# Patient Record
Sex: MALE | Race: OTHER
[De-identification: names, ages, dates, MRNs, and addresses within clinical notes are randomized per-mention and may not be internally consistent; named-entity substitution may affect disease eponyms.]

---

## 2017-04-27 ENCOUNTER — HOSPITAL ENCOUNTER (OUTPATIENT)
Dept: HOSPITAL 61 - KCIC MRI | Age: 52
Discharge: HOME | End: 2017-04-27
Payer: COMMERCIAL

## 2017-04-27 DIAGNOSIS — M75.121: Primary | ICD-10-CM

## 2017-04-27 PROCEDURE — 73221 MRI JOINT UPR EXTREM W/O DYE: CPT

## 2017-04-27 NOTE — KCIC
PROCEDURE 

MRI study of the right shoulder without contrast 

 

HISTORY 

Lifting injury. Patient dislocated the right shoulder 2 weeks ago. Right 

shoulder pain and limited range of motion. 

 

TECHNIQUE 

Noncontrast MRI sequences of the right shoulder were performed in all 3 

planes. 

 

COMPARISON 

None available. 

 

FINDINGS 

There is increased signal within the supraspinatus and infraspinatus 

tendons consistent with tendinosis. There is a complete tear of the 

lateral aspect of the supraspinatus tendon at the attachment to the 

footplate. The AP dimension of the defect measures 14 millimeters and the 

transverse dimension measures 15 millimeters. The tear extends into the 

lateral aspect of the infraspinatus tendon as well. A delamination 

intrasubstance tear of the lateral aspect of the infraspinatus tendon and 

muscle is seen. No retraction of the tendons is seen. A small amount of 

fluid is seen within the subdeltoid and subacromial bursa as a result of 

the complete tear. More prominent fluid is seen extending posteriorly and 

inferiorly between the infraspinatus/teres minor muscles and the deltoid 

muscle. The subscapularis tendon and transverse ligament are intact. The 

tendon of the long head of the biceps is intact. No muscle atrophy is 

evident. There is moderate primary degenerative osteoarthritis and 

spurring of the AC joint. There is mild spurring of the inferior edge of 

the acromial process. Type 3 acromial process is seen. These findings may 

impinge the acromial humeral space. There are mild chronic cystic and 

erosive changes of the posterior lateral aspect of the humeral head 

secondary to chronic impingement. The glenoid labrum is intact. No 

paralabral ganglion cyst or spinoglenoid notch ganglion cyst is seen. Mild

degenerative spurring of the glenohumeral joint is seen. Small 

glenohumeral joint effusion is seen. No loose body is evident. No fracture

or marrow infiltrative process is seen. 

 

IMPRESSION 

Complete rotator cuff tear. 

 

Electronically signed by: Heraclio Hamm MD (Apr 27, 2017 14:58:27)

## 2017-08-17 ENCOUNTER — HOSPITAL ENCOUNTER (OUTPATIENT)
Dept: HOSPITAL 61 - SURGPAT | Age: 52
Discharge: HOME | End: 2017-08-17
Attending: ORTHOPAEDIC SURGERY
Payer: COMMERCIAL

## 2017-08-17 DIAGNOSIS — R07.9: ICD-10-CM

## 2017-08-17 DIAGNOSIS — Z01.818: Primary | ICD-10-CM

## 2017-08-17 LAB
ANION GAP SERPL CALC-SCNC: 9 MMOL/L (ref 6–14)
APTT BLD: 30 SEC (ref 24–38)
BASOPHILS # BLD AUTO: 0 X10^3/UL (ref 0–0.2)
BASOPHILS NFR BLD: 1 % (ref 0–3)
BUN SERPL-MCNC: 12 MG/DL (ref 8–26)
CALCIUM SERPL-MCNC: 9 MG/DL (ref 8.5–10.1)
CHLORIDE SERPL-SCNC: 105 MMOL/L (ref 98–107)
CO2 SERPL-SCNC: 26 MMOL/L (ref 21–32)
CREAT SERPL-MCNC: 0.6 MG/DL (ref 0.7–1.3)
EOSINOPHIL NFR BLD: 5 % (ref 0–3)
ERYTHROCYTE [DISTWIDTH] IN BLOOD BY AUTOMATED COUNT: 12.7 % (ref 11.5–14.5)
GFR SERPLBLD BASED ON 1.73 SQ M-ARVRAT: 140.9 ML/MIN
GLUCOSE SERPL-MCNC: 136 MG/DL (ref 70–99)
HCT VFR BLD CALC: 43.6 % (ref 39–53)
HGB BLD-MCNC: 15.3 G/DL (ref 13–17.5)
INR PPP: 1 (ref 0.8–1.1)
LYMPHOCYTES # BLD: 1.3 X10^3/UL (ref 1–4.8)
LYMPHOCYTES NFR BLD AUTO: 25 % (ref 24–48)
MCH RBC QN AUTO: 30 PG (ref 25–35)
MCHC RBC AUTO-ENTMCNC: 35 G/DL (ref 31–37)
MCV RBC AUTO: 86 FL (ref 79–100)
MONOCYTES NFR BLD: 8 % (ref 0–9)
NEUTROPHILS NFR BLD AUTO: 62 % (ref 31–73)
PLATELET # BLD AUTO: 166 X10^3/UL (ref 140–400)
POTASSIUM SERPL-SCNC: 4 MMOL/L (ref 3.5–5.1)
PROTHROMBIN TIME: 12.7 SEC (ref 11.7–14)
RBC # BLD AUTO: 5.08 X10^6/UL (ref 4.3–5.7)
SODIUM SERPL-SCNC: 140 MMOL/L (ref 136–145)
WBC # BLD AUTO: 5.3 X10^3/UL (ref 4–11)

## 2017-08-17 PROCEDURE — 85025 COMPLETE CBC W/AUTO DIFF WBC: CPT

## 2017-08-17 PROCEDURE — 85730 THROMBOPLASTIN TIME PARTIAL: CPT

## 2017-08-17 PROCEDURE — 80048 BASIC METABOLIC PNL TOTAL CA: CPT

## 2017-08-17 PROCEDURE — 85610 PROTHROMBIN TIME: CPT

## 2017-08-17 PROCEDURE — 36415 COLL VENOUS BLD VENIPUNCTURE: CPT

## 2017-08-17 PROCEDURE — 71020: CPT

## 2017-08-17 PROCEDURE — 87641 MR-STAPH DNA AMP PROBE: CPT

## 2017-08-17 PROCEDURE — 93005 ELECTROCARDIOGRAM TRACING: CPT

## 2017-08-17 NOTE — EKG
[facility]

               [address] [place] [address]

Test Date:    2017               Test Time:    11:33:40

Pat Name:     PETRA AYALA            Department:   

Patient ID:   [id number]           Room:          

Gender:                               Technician:   

:          1965               Requested By: CHIKA TODD

Order Number: [id number]            Reading MD:   Michelle Gong

                                 Measurements

Intervals                              Axis          

Rate:         76                       P:            28

NC:           154                      QRS:          -31

QRSD:         84                       T:            58

QT:           356                                    

QTc:          405                                    

                           Interpretive Statements

SINUS RHYTHM

NORMAL EKG

Electronically Signed On 2017 20:57:22 CDT by Michelle Gong

## 2017-08-17 NOTE — RAD
Chest radiograph 8/17/2017 at 1145 hours



Indication: Preoperative for rotator cuff repair.



Comparison: None available



Technique: PA and lateral views of the chest are provided.



Findings:



Cardiomediastinal silhouette is within normal limits. No pleural effusions,

pulmonary vascular congestion or pneumothorax. The lungs are clear.



Osseous structures are normal.



Impression: 



No acute cardiopulmonary process.

## 2017-08-24 ENCOUNTER — HOSPITAL ENCOUNTER (OUTPATIENT)
Dept: HOSPITAL 61 - SURG | Age: 52
Discharge: HOME | End: 2017-08-24
Attending: ORTHOPAEDIC SURGERY
Payer: COMMERCIAL

## 2017-08-24 VITALS
SYSTOLIC BLOOD PRESSURE: 128 MMHG | SYSTOLIC BLOOD PRESSURE: 128 MMHG | DIASTOLIC BLOOD PRESSURE: 66 MMHG | DIASTOLIC BLOOD PRESSURE: 66 MMHG | SYSTOLIC BLOOD PRESSURE: 128 MMHG | DIASTOLIC BLOOD PRESSURE: 66 MMHG

## 2017-08-24 VITALS — HEIGHT: 69 IN | BODY MASS INDEX: 46.65 KG/M2 | WEIGHT: 315 LBS

## 2017-08-24 DIAGNOSIS — Z86.39: ICD-10-CM

## 2017-08-24 DIAGNOSIS — M75.121: Primary | ICD-10-CM

## 2017-08-24 DIAGNOSIS — Z87.01: ICD-10-CM

## 2017-08-24 DIAGNOSIS — K21.9: ICD-10-CM

## 2017-08-24 DIAGNOSIS — Z87.891: ICD-10-CM

## 2017-08-24 DIAGNOSIS — Z88.1: ICD-10-CM

## 2017-08-24 DIAGNOSIS — E11.9: ICD-10-CM

## 2017-08-24 PROCEDURE — C1782 MORCELLATOR: HCPCS

## 2017-08-24 PROCEDURE — 23412 REPAIR ROTATOR CUFF CHRONIC: CPT

## 2017-08-24 PROCEDURE — 29822 SHO ARTHRS SRG LMTD DBRDMT: CPT

## 2017-08-24 PROCEDURE — 82962 GLUCOSE BLOOD TEST: CPT

## 2017-08-24 RX ADMIN — FENTANYL CITRATE PRN MCG: 50 INJECTION INTRAMUSCULAR; INTRAVENOUS at 16:50

## 2017-08-24 RX ADMIN — HYDROMORPHONE HYDROCHLORIDE PRN MG: 2 INJECTION INTRAMUSCULAR; INTRAVENOUS; SUBCUTANEOUS at 16:19

## 2017-08-24 RX ADMIN — FENTANYL CITRATE PRN MCG: 50 INJECTION INTRAMUSCULAR; INTRAVENOUS at 16:28

## 2017-08-24 RX ADMIN — HYDROMORPHONE HYDROCHLORIDE PRN MG: 2 INJECTION INTRAMUSCULAR; INTRAVENOUS; SUBCUTANEOUS at 15:45

## 2017-08-24 RX ADMIN — HYDROMORPHONE HYDROCHLORIDE PRN MG: 2 INJECTION INTRAMUSCULAR; INTRAVENOUS; SUBCUTANEOUS at 16:31

## 2017-08-24 RX ADMIN — FENTANYL CITRATE PRN MCG: 50 INJECTION INTRAMUSCULAR; INTRAVENOUS at 16:00

## 2017-08-24 RX ADMIN — FENTANYL CITRATE PRN MCG: 50 INJECTION INTRAMUSCULAR; INTRAVENOUS at 16:18

## 2017-08-24 RX ADMIN — HYDROMORPHONE HYDROCHLORIDE PRN MG: 2 INJECTION INTRAMUSCULAR; INTRAVENOUS; SUBCUTANEOUS at 16:00

## 2017-08-24 NOTE — DISCH
DISCHARGE INSTRUCTIONS


Condition on Discharge


Condition on Discharge:  Stable





Activity After Discharge


Activity Instructions for Disc:  Activity as tolerated


Lifting Instructions after Dis:  No heavy lifting


Driving Instructions after Dis:  Do not drive


Weight Bearing Status after Di:  Non weight bearing (Right arm)





Diet after Discharge


Diet after Discharge:  Diabetic No Calorie Level





Wound Incision Care


Wound/Incision Care:  Ice to area for comfort, May get incision wet





Contacting the DR. after DC


Call your doctor for:  If at any time there are any signs of infection (

increased swelling, redness, drainage from the incisions, warmth, fever, chills

, or severe pain unrelieved by prescribed medication) or if you have any 

questions or concerns, contact our office at [phone].











ROMAINE REID PAC Aug 24, 2017 17:16

## 2017-08-24 NOTE — PDOC
BRIEF OPERATIVE NOTE


Date:  Aug 24, 2017


Pre-Op Diagnosis


Right complete rotator cuff tear


Post-Op Diagnosis


same


Procedure Performed


Right shoulder arthroscopy and mini open rotator cuff repair


Surgeon


Sophia Hand MD


Assistant


Romaine Pineda PA-C


Blood Loss


20


Specimens Obtained


none


Findings


none











ROMAINE PINEDA [facility] Aug 24, 2017 16:04

## 2017-08-28 NOTE — PDOC4
Operative Note


Operative Note


Preoperative Diagnosis:


Right complete rotator cuff tear M75.121





Postoperative Diagnosis:


Right complete rotator cuff tear M75.121








Operation Performed:


Diagnostic Shoulder arthroscopy with limited debridement, CPT 29466


2. Arthroscopic subacromial debridement, acromioplasty, CPT 44040


3. Mini-open double-row rotator cuff repair, CPT 17711








Surgeon: Chika Todd MD


Assistant: Kirby Pineda PA-C


Anesthesia: General


Estimated Blood Loss: 20 cc


Implants:  2 5.5mm [place] QuattroX double-loaded, and one 5.5 mm triple loaded 

anchor medially, 2 5.5mm Quattro Link


Knotless anchors laterally


Surgical Indication: The patient is a 51 year old right-hand dominant male with 

severe right shoulder pain located laterally and anteriorly who has failed 

conservative treatment and


presents now for the above stated procedure after the risks and benefits were 

explained in the clinic. His surgery has been delayed due to his poorly 

controlled diabetes that he has gotten better control over. 





Findings: Large complete rotator cuff tear, retracted to the mid humeral head, 

scuffing of the acromion, acromial overhang. 





Description of Procedure:


The patient was identified, marked, and the procedure was reconfirmed by myself 

prior to taking them to the operating room. IV antibiotics were given 

preoperatively. The patient was positioned appropriately in the beach chair 

position and underwent adequate general anesthesia. He was a very difficult 

intubation.  The right arm was prepped and draped in a standard surgical 

fashion. The portals were preinjected with 0.25% marcaine with epi, and 30 cc 

of .25% Marcaine was placed into the subacromial space. An 11 blade was used to 

create a posterior portal followed by a rotator interval portal and a 

diagnostic arthroscopy was performed with the above-said findings. The 

motorized shaver was introduced through the lateral portal to debride the 

frayed posterior labrum, the undersurface of the rotator cuff, and the greater 

tuberosity. This completes the limited debridement, CPT 41392. The arthroscope 

was then placed in the subacromial space. There was significant retraction and 

adhesions that were debrided to reveal the complete retracted rotator cuff 

tear. THe tear included his the entire supraspinatus extending into most of the 

infraspinatus. A subacromial decompression and partial acromioplasty was 

accomplished with a glenroy prior to making the decision to perform a min-open 

rotator cuff repair due to the size of the tear and the amount of retraction. 

The tissue quality of the tendon was moderately robust. This ended the 

arthroscopic portion of the case, CPT 56733.


The shoulder was reprepped. A 3 inch anterolateral incision was made through 

the skin, and the deltoid muscle was spread down to bone. The axillary nerve 

was not visualized, but I


stayed proximal to 5 cm distal from the acromion to ensure it stayed protected. 

The deltoid was split at the anterior raphe. The rotator cuff tear was located 

with a grasper and adhesions were taken down superior and inferior to the 

tendon to the level of the glenoid. At this time the tendon had very good 

bounce and was easily reducible to the medial aspect of the footprint. There 

was a split between the anterior and posterior cuff that was repaired with 

three sutures in a side to side manner. 


The footprint was further prepared with a rasp to a bleeding surface. 2 5.5 mm 

double-loaded and one triple loaded anchor were placed medially just off the 

articular surface of the humerus, approximately 1 cm apart in the anterior to 

posterior direction. The limbs of suture were passed through the rotator cuff 

tendon, from posterior to anterior with a free needle, while the tendon was 

approximated to the medial footprint with a tag suture. After all the limbs 

were passed, the pairs of sutures were tied in a mattress fashion 

reapproximating the medial border of the anatomic foot print for a tension-free 

repair. The suture tails were divided, and one from each knot was placed into 

two separate anchors forming a lateral row of 4.5mm Link knotless anchors. At 

that time the footprint was well established, the repair was solid, and moved 

as a unit without being overly tensioned. The wound was irrigated thoroughly. 

The deltoid muscle was repaired with 0 vicryl. There was no defect in the 

deltoid after repair. The incision was again irrigated and closed in layers 

with 3-0 monocryl for subcuticular sutures and prineo for the skin. Operative 

sites were cleaned and dry. Sterile dressings were applied. The patient was 

placed in a well-padded sling, transferred to the stretcher in recovery awake, 

alert, and in stable condition











CHIKA TODD MD Aug 28, 2017 21:07

## 2018-04-25 ENCOUNTER — HOSPITAL ENCOUNTER (OUTPATIENT)
Dept: HOSPITAL 61 - KCIC | Age: 53
Discharge: HOME | End: 2018-04-25
Attending: ORTHOPAEDIC SURGERY
Payer: COMMERCIAL

## 2018-04-25 DIAGNOSIS — G89.29: ICD-10-CM

## 2018-04-25 DIAGNOSIS — Z87.891: ICD-10-CM

## 2018-04-25 DIAGNOSIS — M25.511: Primary | ICD-10-CM

## 2018-04-25 DIAGNOSIS — E11.9: ICD-10-CM

## 2018-04-25 PROCEDURE — 73040 CONTRAST X-RAY OF SHOULDER: CPT

## 2018-04-25 PROCEDURE — 73222 MRI JOINT UPR EXTREM W/DYE: CPT

## 2018-04-25 RX ADMIN — IOHEXOL 1 ML: 300 INJECTION, SOLUTION INTRAVENOUS at 14:17

## 2018-04-25 RX ADMIN — LIDOCAINE HYDROCHLORIDE 1 ML: 10 INJECTION, SOLUTION INFILTRATION; PERINEURAL at 14:16

## 2018-04-25 RX ADMIN — GADOBUTROL 1 MMOL: 604.72 INJECTION INTRAVENOUS at 14:16

## 2019-09-05 ENCOUNTER — HOSPITAL ENCOUNTER (OUTPATIENT)
Dept: HOSPITAL 61 - PETSC | Age: 54
Discharge: HOME | End: 2019-09-05
Payer: COMMERCIAL

## 2019-09-05 DIAGNOSIS — R91.1: Primary | ICD-10-CM

## 2019-09-05 PROCEDURE — 78815 PET IMAGE W/CT SKULL-THIGH: CPT

## 2019-09-05 PROCEDURE — A9552 F18 FDG: HCPCS

## 2019-09-05 NOTE — RAD
CLINICAL HISTORY: Lung nodule



INDICATION:  Initial evaluation.



COMPARISON: None available.



TECHNIQUE: 

Location of scan: [facility]



Radiopharmaceutical Dose:  14.68 mCi F-18 FDG intravenous

Blood glucose at time of study: 193

FDG uptake time = 60 minutes.



FINDINGS/IMPRESSION:

Following injection of the radiotracer, the patient was placed in the PET/CT

scanner. At that point the patient had claustrophobia and elected to terminate

the scan.



Radiation Dosimetry:

The radiopharmaceutical used for this exam delivers approximately 0.7 mSv/mCi

(70 mRem/mCi)

Source:  ICRP Publication 106

## 2019-09-12 ENCOUNTER — HOSPITAL ENCOUNTER (OUTPATIENT)
Dept: HOSPITAL 61 - PETSC | Age: 54
Discharge: HOME | End: 2019-09-12
Payer: COMMERCIAL

## 2019-09-12 DIAGNOSIS — K76.0: Primary | ICD-10-CM

## 2019-09-12 DIAGNOSIS — I25.10: ICD-10-CM

## 2019-09-12 PROCEDURE — A9552 F18 FDG: HCPCS

## 2019-09-12 PROCEDURE — 78815 PET IMAGE W/CT SKULL-THIGH: CPT

## 2019-09-12 NOTE — RAD
Examination: PET W CT SKULL TO MIDTHIGH

 

History: Pulmonary nodule

 

Comparison/Correlation: None

 

FINDINGS:  Net dose 15.29 mCi F-18 FDG was administered intravenously for 

purposes of PET/CT exam.  Blood glucose level at the time of radiotracer 

administration was 173 mg/dL.  Imaging was performed from the skull base 

to the proximal thighs.  Hepatic reference uptake is SUV max of  up to 3 .

 

Uptake of radiotracer is unremarkable. No abnormal accumulation to suggest

active neoplastic disease. No suspicious infiltrate is no suspicious 

uptake involving the lung fields.

 

Incidental note is made of moderate calcification involving the proximal 

left anterior descending coronary artery. No radiopaque collecting system 

calculi. Urinary bladder is mostly decompressed. Fatty infiltration of the

liver diffusely is seen.

 

IMPRESSION:

No abnormal uptake to suggest suspicious process. No suspicious pulmonary 

nodules identified.

 

 

 

PQRS Compliance Statement:

 

One or more of the following individualized dose reduction techniques were

utilized for this examination:  

1. Automated exposure control  

2. Adjustment of the mA and/or kV according to patient size  

3. Use of iterative reconstruction technique

 

Electronically signed by: Porter Davis MD (9/12/2019 7:29 PM) [facility]

## 2021-01-03 ENCOUNTER — HOSPITAL ENCOUNTER (EMERGENCY)
Dept: HOSPITAL 63 - ER | Age: 56
LOS: 1 days | Discharge: HOME | End: 2021-01-04
Payer: COMMERCIAL

## 2021-01-03 VITALS — BODY MASS INDEX: 46.65 KG/M2 | WEIGHT: 315 LBS | HEIGHT: 69 IN
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
## 2021-01-03 DIAGNOSIS — M25.561: Primary | ICD-10-CM
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
## 2021-01-03 DIAGNOSIS — R60.0: ICD-10-CM
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
## 2021-01-03 DIAGNOSIS — E11.9: ICD-10-CM
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
## 2021-01-03 LAB
ALBUMIN SERPL-MCNC: 2.2 G/DL (ref 3.4–5)
ALBUMIN/GLOB SERPL: 0.6 {RATIO} (ref 1–1.7)
ALP SERPL-CCNC: 61 U/L (ref 46–116)
ALT SERPL-CCNC: 23 U/L (ref 16–63)
ANION GAP SERPL CALC-SCNC: 10 MMOL/L (ref 6–14)
AST SERPL-CCNC: 22 U/L (ref 15–37)
BASOPHILS # BLD AUTO: 0 X10^3/UL (ref 0–0.2)
BASOPHILS NFR BLD: 1 % (ref 0–3)
BILIRUB SERPL-MCNC: 0.2 MG/DL (ref 0.2–1)
BUN/CREAT SERPL: 22 (ref 6–20)
CA-I SERPL ISE-MCNC: 29 MG/DL (ref 8–26)
CALCIUM SERPL-MCNC: 8.3 MG/DL (ref 8.5–10.1)
CHLORIDE SERPL-SCNC: 107 MMOL/L (ref 98–107)
CO2 SERPL-SCNC: 26 MMOL/L (ref 21–32)
CREAT SERPL-MCNC: 1.3 MG/DL (ref 0.7–1.3)
EOSINOPHIL NFR BLD: 0.1 X10^3/UL (ref 0–0.7)
EOSINOPHIL NFR BLD: 2 % (ref 0–3)
ERYTHROCYTE [DISTWIDTH] IN BLOOD BY AUTOMATED COUNT: 13.2 % (ref 11.5–14.5)
GFR SERPLBLD BASED ON 1.73 SQ M-ARVRAT: 56.9 ML/MIN
GLOBULIN SER-MCNC: 3.8 G/DL (ref 2.2–3.8)
GLUCOSE SERPL-MCNC: 171 MG/DL (ref 70–99)
HCT VFR BLD CALC: 36.3 % (ref 39–53)
HGB BLD-MCNC: 12.2 G/DL (ref 13–17.5)
LYMPHOCYTES # BLD: 1.6 X10^3/UL (ref 1–4.8)
LYMPHOCYTES NFR BLD AUTO: 25 % (ref 24–48)
MCH RBC QN AUTO: 29 PG (ref 25–35)
MCHC RBC AUTO-ENTMCNC: 34 G/DL (ref 31–37)
MCV RBC AUTO: 86 FL (ref 79–100)
MONO #: 0.6 X10^3/UL (ref 0–1.1)
MONOCYTES NFR BLD: 9 % (ref 0–9)
NEUT #: 4 X10^3UL (ref 1.8–7.7)
NEUTROPHILS NFR BLD AUTO: 64 % (ref 31–73)
PLATELET # BLD AUTO: 211 X10^3/UL (ref 140–400)
POTASSIUM SERPL-SCNC: 3.2 MMOL/L (ref 3.5–5.1)
PROT SERPL-MCNC: 6 G/DL (ref 6.4–8.2)
RBC # BLD AUTO: 4.25 X10^6/UL (ref 4.3–5.7)
SODIUM SERPL-SCNC: 143 MMOL/L (ref 136–145)
WBC # BLD AUTO: 6.3 X10^3/UL (ref 4–11)

## 2021-01-03 PROCEDURE — 85379 FIBRIN DEGRADATION QUANT: CPT

## 2021-01-03 PROCEDURE — 99284 EMERGENCY DEPT VISIT MOD MDM: CPT

## 2021-01-03 PROCEDURE — 73552 X-RAY EXAM OF FEMUR 2/>: CPT

## 2021-01-03 PROCEDURE — 85025 COMPLETE CBC W/AUTO DIFF WBC: CPT

## 2021-01-03 PROCEDURE — 80053 COMPREHEN METABOLIC PANEL: CPT

## 2021-01-03 PROCEDURE — 36415 COLL VENOUS BLD VENIPUNCTURE: CPT

## 2021-01-03 NOTE — RAD
INDICATION: Reason: FALL, DISTAL FEMUR PAIN / Spl. Instructions:  / History: 



COMPARISON: None.



IMPRESSION: 



Right femur: 4 views obtained. Patella baja. No acute fracture or dislocation of the femur. Degenerat
kadie changes of the hip and knee. Minimal irregularity at the superior pole the patella. Could be from
 a small osteophyte within the region



Electronically signed by: Errol Retana MD (1/3/2021 11:15 PM) [id number]

## 2021-01-03 NOTE — PHYS DOC
Past History


Past Medical History:  Diabetes


 (JOVAN PARKER)


Alcohol Use:  None


 (JOVAN PARKER)





Adult General


Chief Complaint


Chief Complaint:  LOWER EXT PAIN





[facility]


HPI





Patient is a 55-year-old male presents emergency department complaining of right

distal thigh pain that he associates with a blood clot.  Patient denies any 

history of blood clots.  Patient states that he slipped and fell yesterday 

landing on his right knee.  Patient did not experience any pain at the time, 

patient states he went to bed, when he woke up and tried to ambulate he noticed 

pain at the distal right thigh.  Patient states he can ambulate but it feels 

like somebody is stabbing him in the leg when he walks.  Patient states he was 

talking with his son who convinced him that he probably has a blood clot.  

Patient states he came straight to emergency department to have a blood clot 

work-up.  Patient states he has a longstanding history of idiopathic lymphedema,

states he is supposed to wear compression garments in his lower extremities to 

prevent his lower extremity edema from getting bad.  Patient states he takes 

medications for type 2 diabetes as well.  Patient states his only surgical 

history is a right shoulder rotator cuff surgery 4 years ago, and calcium 

removed from his left foot 37 years ago.  Patient states he takes Metformin, 

Januvia, Lasix, potassium chloride.


 (JOVAN PARKER)





Review of Systems


Review of Systems





Constitutional: Denies fever or chills []


Eyes: Denies change in visual acuity, redness, or eye pain []


HENT: Denies nasal congestion or sore throat []


Respiratory: Denies cough or shortness of breath []


Cardiovascular: No additional information not addressed in HPI []


GI: Denies abdominal pain, nausea, vomiting, bloody stools or diarrhea []


: Denies dysuria or hematuria []


Musculoskeletal: Denies back pain or joint pain []


Integument: Denies rash or skin lesions []


Neurologic: Denies headache, focal weakness or sensory changes []


Endocrine: Denies polyuria or polydipsia []





All other systems were reviewed and found to be within normal limits, except as 

documented in this note.


 (JOVAN PARKER)





Allergies


Allergies


Patient denies any allergies to medications.


 (JOVAN PARKER)





Physical Exam


Physical Exam





Constitutional: Well developed, well nourished, no acute distress, non-toxic 

appearance. 


HENT: Normocephalic, atraumatic, bilateral external ears normal, oropharynx 

moist, no oral exudates, nose normal. 


Eyes: PERRLA, EOMI, conjunctiva normal, no discharge.  


Neck: Normal range of motion, no tenderness, supple, no stridor. 


Cardiovascular:Heart rate regular rhythm, no murmur 


Lungs & Thorax:  Bilateral breath sounds clear to auscultation 


Abdomen: Bowel sounds normal, soft, no tenderness, no masses, no pulsatile 

masses.  


Skin: Warm, dry, no erythema, no rash.  


Back: No tenderness, no CVA tenderness.  


Extremities: No tenderness, no cyanosis, no clubbing, ROM intact, marked +4 p

itting edema lower extremities bilaterally.  Patient states this is normal for 

him.  Patient is not wearing his compression garments as he says he should.  

Pain elicited to the right lateral distal femur area just above knee to 

palpation.  No crepitus noted, no bruising noted, erythema noted.  Patient is 

able to ambulate on lower extremities without limp.  Patient uses cane for ass

istive device for walking.


Neurologic: Alert and oriented X 3, normal motor function, normal sensory functi

on, no focal deficits noted. 


Psychologic: Affect normal, judgement normal, mood normal. 


 (JOVAN PARKER)





Current Patient Data


Vital Signs





                                   Vital Signs








  Date Time  Temp Pulse Resp B/P (MAP) Pulse Ox O2 Delivery O2 Flow Rate FiO2


 


1/3/21 20:35 98.6 102 16 162/70 (100) 98   








Lab Results





                                Laboratory Tests








Test


 1/3/21


21:01


 


White Blood Count


 6.3 x10^3/uL


(4.0-11.0)


 


Red Blood Count


 4.25 x10^6/uL


(4.30-5.70)  L


 


Hemoglobin


 12.2 g/dL


(13.0-17.5)  L


 


Hematocrit


 36.3 %


(39.0-53.0)  L


 


Mean Corpuscular Volume


 86 fL ()





 


Mean Corpuscular Hemoglobin 29 pg (25-35)  


 


Mean Corpuscular Hemoglobin


Concent 34 g/dL


(31-37)


 


Red Cell Distribution Width


 13.2 %


(11.5-14.5)


 


Platelet Count


 211 x10^3/uL


(140-400)


 


Neutrophils (%) (Auto) 64 % (31-73)  


 


Lymphocytes (%) (Auto) 25 % (24-48)  


 


Monocytes (%) (Auto) 9 % (0-9)  


 


Eosinophils (%) (Auto) 2 % (0-3)  


 


Basophils (%) (Auto) 1 % (0-3)  


 


Neutrophils # (Auto)


 4.0 x10^3uL


(1.8-7.7)


 


Lymphocytes # (Auto)


 1.6 x10^3/uL


(1.0-4.8)


 


Monocytes # (Auto)


 0.6 x10^3/uL


(0.0-1.1)


 


Eosinophils # (Auto)


 0.1 x10^3/uL


(0.0-0.7)


 


Basophils # (Auto)


 0.0 x10^3/uL


(0.0-0.2)


 


Sodium Level


 143 mmol/L


(136-145)


 


Potassium Level


 3.2 mmol/L


(3.5-5.1)  L


 


Chloride Level


 107 mmol/L


()


 


Carbon Dioxide Level


 26 mmol/L


(21-32)


 


Anion Gap 10 (6-14)  


 


Blood Urea Nitrogen


 29 mg/dL


(8-26)  H


 


Creatinine


 1.3 mg/dL


(0.7-1.3)


 


Estimated GFR


(Cockcroft-Gault) 56.9  





 


BUN/Creatinine Ratio 22 (6-20)  H


 


Glucose Level


 171 mg/dL


(70-99)  H


 


Calcium Level


 8.3 mg/dL


(8.5-10.1)  L


 


Total Bilirubin


 0.2 mg/dL


(0.2-1.0)


 


Aspartate Amino Transferase


(AST) 22 U/L (15-37)





 


Alanine Aminotransferase (ALT)


 23 U/L (16-63)





 


Alkaline Phosphatase


 61 U/L


()


 


Total Protein


 6.0 g/dL


(6.4-8.2)  L


 


Albumin


 2.2 g/dL


(3.4-5.0)  L


 


Albumin/Globulin Ratio


 0.6 (1.0-1.7)


L








 (JOVAN PARKER)





EKG


EKG


[]


 (JOVAN PARKER)





Radiology/Procedures


Radiology/Procedures


[]


 (JOVAN PARKER)





Heart Score


Risk Factors:


Risk Factors:  DM, Current or recent (<one month) smoker, HTN, HLP, family 

history of CAD, obesity.


Risk Scores:


Risk Factors:  DM, Current or recent (<one month) smoker, HTN, HLP, family 

history of CAD, obesity.


 (JOVAN PARKER)





Course & Med Decision Making


Course & Med Decision Making


Pertinent Labs and Imaging studies reviewed. (See chart for details)





55-year-old male presents emergency department complaining he might have a blood

 clot in his leg.  Patient did fall yesterday, patient states he had no pain 

until he woke up the next day.  Patient's physical exam noticed marked bilateral

 lower leg edema, patient does have a history of idiopathic lymphedema.  Related

 to patient's history of diabetes and history of lymphedema, labs were ordered 

and D-dimer was ordered to rule out blood clot.  Imaging pending lab results at 

this time.





At 2236.  X-ray ordered of right femur, discussed patient case with ED attending

 Dr. Carrrea who assumed patient care at this time.





 (JOVAN PARKER)


Course & Med Decision Making


I oversaw on the above date of service of this patient and discussed the care 

with the NP.  I personally evaluated patient repeating certain aspects of 

history and physical exam.  I reviewed entirety of ER work-up, I disclosed 

elevated D-dimer but discussed that there is no indication for ultrasound given 

extremely low risk of DVT.  Patient's pain is distal and lateral right thigh at 

point of impact of fall.  He is morbidly obese, likely suffering self-limiting 

musculoskeletal condition from impact.  Pain location and description make DVT 

less likely diagnosis.  I discussed that I would not have ordered D-dimer 

testing first place and disclosed that factors such as age, gender, weight etc. 

affect this value.  Ultimately, patient hemodynamically stable, afebrile and 

ambulatory prior to departure with continued supportive care and PCP follow-up 

advised.  I agree with the findings, plan of care, and disposition as 

documented.





 (FE CARRERA DO)


Farhanaon Disclaimer


Dragon Disclaimer


This electronic medical record was generated, in whole or in part, using a voice

 recognition dictation system.


 (JOVAN PARKER)





Departure


Departure:


Impression:  


   Primary Impression:  


   Right knee pain


Disposition:  01 DC HOME SELF CARE/HOMELESS


Condition:  GOOD


Referrals:  


REGGIE PAZ MD (PCP)


Patient Instructions:  Contusion, Knee Exercises, Generic, SportsMed





Additional Instructions:  


It is likely that you have experienced a sprain/strain/contusion to the right 

lateral portion of your thigh that is likely causing you pain.  As discussed 

today, there were no bony abnormalities.  You had a D-dimer performed and was 

elevated but this was likely due to other comorbidities such as your weight, it 

was disclosed that it is very unlikely that given your history of presenting 

illness and physical exam findings of a blood clot given the lateral location of

 your pain.  As such, the best treatment for this injury is continued range of 

motion (non weight bearing) to prevent a frozen joint. A Rest, Ice, Compression,

 Elevation (RICE) strategy may also be helpful in the acute phase. Please follow

 up with your primary doctor. Please return to the ED if new or worrisome 

symptoms arise.  It was a pleasure to take care of you and I wish you the best 

going forward











JOVAN PARKER        Harshad 3, 2021 22:33


FE CARRERA DO                  Harshad 3, 2021 23:45

## 2021-01-04 VITALS
DIASTOLIC BLOOD PRESSURE: 70 MMHG | DIASTOLIC BLOOD PRESSURE: 70 MMHG | SYSTOLIC BLOOD PRESSURE: 162 MMHG | SYSTOLIC BLOOD PRESSURE: 162 MMHG

## 2021-03-05 ENCOUNTER — HOSPITAL ENCOUNTER (OUTPATIENT)
Dept: HOSPITAL 63 - NM | Age: 56
End: 2021-03-05
Attending: INTERNAL MEDICINE
Payer: COMMERCIAL

## 2021-03-05 DIAGNOSIS — E11.43: Primary | ICD-10-CM

## 2021-03-05 PROCEDURE — A9541 TC99M SULFUR COLLOID: HCPCS

## 2021-03-05 PROCEDURE — 78264 GASTRIC EMPTYING IMG STUDY: CPT

## 2021-03-05 NOTE — RAD
History: Reason: pt has normal stool every 2-3 days, decreased appetite, diabetic / Spl. Instructions
:  / History: 



Procedure: 

The patient ate a standard meal containing 2 mCi Tc-99m sulfur colloid. Scintigraphic images of the a
bdomen were obtained. Counts were obtained.



Findings:



Time to half emptying for solids is estimated at 32 minutes.



Impression:



 Rapid gastric emptying with time to half emptying for solids is estimated at 32 minutes which is fas
ter than typically seen.





Electronically signed by: Errol Retana MD (3/5/2021 1:16 PM) [id number]

## 2021-10-16 ENCOUNTER — HOSPITAL ENCOUNTER (EMERGENCY)
Dept: HOSPITAL 63 - ER | Age: 56
LOS: 1 days | Discharge: HOME | End: 2021-10-17
Payer: COMMERCIAL

## 2021-10-16 VITALS — BODY MASS INDEX: 37.55 KG/M2 | WEIGHT: 253.53 LBS | HEIGHT: 69 IN

## 2021-10-16 VITALS — SYSTOLIC BLOOD PRESSURE: 162 MMHG | DIASTOLIC BLOOD PRESSURE: 70 MMHG

## 2021-10-16 DIAGNOSIS — D64.9: ICD-10-CM

## 2021-10-16 DIAGNOSIS — R94.4: ICD-10-CM

## 2021-10-16 DIAGNOSIS — I10: ICD-10-CM

## 2021-10-16 DIAGNOSIS — E86.0: ICD-10-CM

## 2021-10-16 DIAGNOSIS — S20.219A: Primary | ICD-10-CM
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
## 2021-10-16 DIAGNOSIS — E11.9: ICD-10-CM

## 2021-10-16 LAB
ALBUMIN SERPL-MCNC: 2.9 G/DL (ref 3.4–5)
ALP SERPL-CCNC: 62 U/L (ref 46–116)
ALT SERPL-CCNC: 35 U/L (ref 16–63)
ANION GAP SERPL CALC-SCNC: 12 MMOL/L (ref 6–14)
AST SERPL-CCNC: 21 U/L (ref 15–37)
BASOPHILS # BLD AUTO: 0 X10^3/UL (ref 0–0.2)
BASOPHILS NFR BLD: 1 % (ref 0–3)
BILIRUB DIRECT SERPL-MCNC: 0.1 MG/DL (ref 0–0.2)
BILIRUB SERPL-MCNC: 0.2 MG/DL (ref 0.2–1)
CA-I SERPL ISE-MCNC: 58 MG/DL (ref 8–26)
CALCIUM SERPL-MCNC: 8.4 MG/DL (ref 8.5–10.1)
CHLORIDE SERPL-SCNC: 107 MMOL/L (ref 98–107)
CO2 SERPL-SCNC: 19 MMOL/L (ref 21–32)
CREAT SERPL-MCNC: 1.6 MG/DL (ref 0.7–1.3)
EOSINOPHIL NFR BLD: 0.1 X10^3/UL (ref 0–0.7)
EOSINOPHIL NFR BLD: 2 % (ref 0–3)
ERYTHROCYTE [DISTWIDTH] IN BLOOD BY AUTOMATED COUNT: 13 % (ref 11.5–14.5)
GFR SERPLBLD BASED ON 1.73 SQ M-ARVRAT: 44.8 ML/MIN
GLUCOSE SERPL-MCNC: 147 MG/DL (ref 70–99)
HCT VFR BLD CALC: 34.3 % (ref 39–53)
HGB BLD-MCNC: 11.5 G/DL (ref 13–17.5)
LIPASE: 141 U/L (ref 73–393)
LYMPHOCYTES # BLD: 1 X10^3/UL (ref 1–4.8)
LYMPHOCYTES NFR BLD AUTO: 17 % (ref 24–48)
MAGNESIUM SERPL-MCNC: 2.4 MG/DL (ref 1.8–2.4)
MCH RBC QN AUTO: 30 PG (ref 25–35)
MCHC RBC AUTO-ENTMCNC: 34 G/DL (ref 31–37)
MCV RBC AUTO: 90 FL (ref 79–100)
MONO #: 0.4 X10^3/UL (ref 0–1.1)
MONOCYTES NFR BLD: 7 % (ref 0–9)
NEUT #: 4.5 X10^3UL (ref 1.8–7.7)
NEUTROPHILS NFR BLD AUTO: 73 % (ref 31–73)
PLATELET # BLD AUTO: 203 X10^3/UL (ref 140–400)
POTASSIUM SERPL-SCNC: 4.7 MMOL/L (ref 3.5–5.1)
PROT SERPL-MCNC: 6.1 G/DL (ref 6.4–8.2)
RBC # BLD AUTO: 3.82 X10^6/UL (ref 4.3–5.7)
SODIUM SERPL-SCNC: 138 MMOL/L (ref 136–145)
WBC # BLD AUTO: 6.1 X10^3/UL (ref 4–11)

## 2021-10-16 PROCEDURE — 71046 X-RAY EXAM CHEST 2 VIEWS: CPT

## 2021-10-16 PROCEDURE — 83690 ASSAY OF LIPASE: CPT

## 2021-10-16 PROCEDURE — 93005 ELECTROCARDIOGRAM TRACING: CPT

## 2021-10-16 PROCEDURE — 99285 EMERGENCY DEPT VISIT HI MDM: CPT

## 2021-10-16 PROCEDURE — 83735 ASSAY OF MAGNESIUM: CPT

## 2021-10-16 PROCEDURE — 36415 COLL VENOUS BLD VENIPUNCTURE: CPT

## 2021-10-16 PROCEDURE — 80048 BASIC METABOLIC PNL TOTAL CA: CPT

## 2021-10-16 PROCEDURE — 80307 DRUG TEST PRSMV CHEM ANLYZR: CPT

## 2021-10-16 PROCEDURE — 85025 COMPLETE CBC W/AUTO DIFF WBC: CPT

## 2021-10-16 PROCEDURE — 96372 THER/PROPH/DIAG INJ SC/IM: CPT

## 2021-10-16 PROCEDURE — 80076 HEPATIC FUNCTION PANEL: CPT

## 2021-10-16 PROCEDURE — 81001 URINALYSIS AUTO W/SCOPE: CPT

## 2021-10-16 PROCEDURE — 82550 ASSAY OF CK (CPK): CPT

## 2021-10-16 PROCEDURE — 84484 ASSAY OF TROPONIN QUANT: CPT

## 2021-10-16 PROCEDURE — 83880 ASSAY OF NATRIURETIC PEPTIDE: CPT
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
## 2021-10-17 LAB
AMORPH SED URNS QL MICRO: PRESENT /HPF
AMPHETAMINE/METHAMPHETAMINE: (no result)
APTT PPP: YELLOW S
BACTERIA #/AREA URNS HPF: (no result) /HPF
BARBITURATES UR-MCNC: (no result) UG/ML
BENZODIAZ UR-MCNC: (no result) UG/L
BILIRUB UR QL STRIP: (no result)
CANNABINOIDS UR-MCNC: (no result) UG/L
COCAINE UR-MCNC: (no result) NG/ML
FIBRINOGEN PPP-MCNC: (no result) MG/DL
GLUCOSE UR STRIP-MCNC: 100 MG/DL
HYALINE CASTS #/AREA URNS LPF: (no result) /HPF
METHADONE SERPL-MCNC: (no result) NG/ML
NITRITE UR QL STRIP: (no result)
OPIATES UR-MCNC: (no result) NG/ML
PCP SERPL-MCNC: (no result) MG/DL
RBC #/AREA URNS HPF: (no result) /HPF (ref 0–2)
SP GR UR STRIP: 1.02
SQUAMOUS #/AREA URNS LPF: (no result) /LPF
UROBILINOGEN UR-MCNC: 0.2 MG/DL
WBC #/AREA URNS HPF: 0 /HPF (ref 0–4)